# Patient Record
Sex: MALE | Race: WHITE | NOT HISPANIC OR LATINO | Employment: PART TIME | URBAN - METROPOLITAN AREA
[De-identification: names, ages, dates, MRNs, and addresses within clinical notes are randomized per-mention and may not be internally consistent; named-entity substitution may affect disease eponyms.]

---
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
## 2019-01-24 ENCOUNTER — CONVERSION ENCOUNTER (OUTPATIENT)
Dept: SURGERY | Facility: CLINIC | Age: 19
End: 2019-01-24

## 2019-01-24 ENCOUNTER — OFFICE VISIT CONVERTED (OUTPATIENT)
Dept: UROLOGY | Facility: CLINIC | Age: 19
End: 2019-01-24
Attending: UROLOGY

## 2020-03-20 ENCOUNTER — TELEMEDICINE CONVERTED (OUTPATIENT)
Dept: INTERNAL MEDICINE | Facility: CLINIC | Age: 20
End: 2020-03-20
Attending: INTERNAL MEDICINE

## 2020-04-08 ENCOUNTER — TELEMEDICINE CONVERTED (OUTPATIENT)
Dept: INTERNAL MEDICINE | Facility: CLINIC | Age: 20
End: 2020-04-08
Attending: NURSE PRACTITIONER

## 2020-05-04 ENCOUNTER — HOSPITAL ENCOUNTER (OUTPATIENT)
Dept: LAB | Facility: HOSPITAL | Age: 20
Discharge: HOME | End: 2020-05-04
Attending: INTERNAL MEDICINE

## 2020-06-01 LAB
Lab: NEGATIVE
Lab: NORMAL

## 2020-08-13 ENCOUNTER — HOSPITAL ENCOUNTER (OUTPATIENT)
Dept: OTHER | Facility: HOSPITAL | Age: 20
Discharge: HOME | End: 2020-08-13
Attending: PHYSICIAN ASSISTANT

## 2020-08-13 ENCOUNTER — OFFICE VISIT CONVERTED (OUTPATIENT)
Dept: INTERNAL MEDICINE | Facility: CLINIC | Age: 20
End: 2020-08-13
Attending: PHYSICIAN ASSISTANT

## 2020-08-13 LAB
ALBUMIN SERPL-MCNC: 4.7 G/DL (ref 3.5–5)
ALBUMIN/GLOB SERPL: 2 {RATIO} (ref 1.4–2.6)
ALP SERPL-CCNC: 57 U/L (ref 53–128)
ALT SERPL-CCNC: 16 U/L (ref 10–40)
ANION GAP SERPL CALC-SCNC: 16 MMOL/L (ref 8–19)
AST SERPL-CCNC: 18 U/L (ref 15–50)
BASOPHILS # BLD AUTO: 0.03 10*3/UL (ref 0–0.2)
BASOPHILS NFR BLD AUTO: 0.7 % (ref 0–3)
BILIRUB SERPL-MCNC: 0.48 MG/DL (ref 0.2–1.3)
BUN SERPL-MCNC: 15 MG/DL (ref 5–25)
BUN/CREAT SERPL: 17 {RATIO} (ref 6–20)
CALCIUM SERPL-MCNC: 10.2 MG/DL (ref 8.7–10.4)
CHLORIDE SERPL-SCNC: 103 MMOL/L (ref 99–111)
CONV ABS IMM GRAN: 0.01 10*3/UL (ref 0–0.2)
CONV CO2: 28 MMOL/L (ref 22–32)
CONV IMMATURE GRAN: 0.2 % (ref 0–1.8)
CONV TOTAL PROTEIN: 7.1 G/DL (ref 6.3–8.2)
CREAT UR-MCNC: 0.89 MG/DL (ref 0.7–1.2)
DEPRECATED RDW RBC AUTO: 38.2 FL (ref 35.1–43.9)
EOSINOPHIL # BLD AUTO: 0.08 10*3/UL (ref 0–0.7)
EOSINOPHIL # BLD AUTO: 1.8 % (ref 0–7)
ERYTHROCYTE [DISTWIDTH] IN BLOOD BY AUTOMATED COUNT: 11.9 % (ref 11.6–14.4)
GFR SERPLBLD BASED ON 1.73 SQ M-ARVRAT: >60 ML/MIN/{1.73_M2}
GLOBULIN UR ELPH-MCNC: 2.4 G/DL (ref 2–3.5)
GLUCOSE SERPL-MCNC: 80 MG/DL (ref 70–99)
HCT VFR BLD AUTO: 48 % (ref 42–52)
HGB BLD-MCNC: 16.1 G/DL (ref 14–18)
LYMPHOCYTES # BLD AUTO: 1.37 10*3/UL (ref 1–5)
LYMPHOCYTES NFR BLD AUTO: 30.6 % (ref 20–45)
MCH RBC QN AUTO: 29.4 PG (ref 27–31)
MCHC RBC AUTO-ENTMCNC: 33.5 G/DL (ref 33–37)
MCV RBC AUTO: 87.8 FL (ref 80–96)
MONOCYTES # BLD AUTO: 0.29 10*3/UL (ref 0.2–1.2)
MONOCYTES NFR BLD AUTO: 6.5 % (ref 3–10)
NEUTROPHILS # BLD AUTO: 2.7 10*3/UL (ref 2–8)
NEUTROPHILS NFR BLD AUTO: 60.2 % (ref 30–85)
NRBC CBCN: 0 % (ref 0–0.7)
OSMOLALITY SERPL CALC.SUM OF ELEC: 296 MOSM/KG (ref 273–304)
PLATELET # BLD AUTO: 163 10*3/UL (ref 130–400)
PMV BLD AUTO: 11.2 FL (ref 9.4–12.4)
POTASSIUM SERPL-SCNC: 4.4 MMOL/L (ref 3.5–5.3)
RBC # BLD AUTO: 5.47 10*6/UL (ref 4.7–6.1)
SODIUM SERPL-SCNC: 143 MMOL/L (ref 135–147)
WBC # BLD AUTO: 4.48 10*3/UL (ref 4.8–10.8)

## 2020-08-28 ENCOUNTER — OFFICE VISIT CONVERTED (OUTPATIENT)
Dept: INTERNAL MEDICINE | Facility: CLINIC | Age: 20
End: 2020-08-28
Attending: PHYSICIAN ASSISTANT

## 2020-08-28 ENCOUNTER — HOSPITAL ENCOUNTER (OUTPATIENT)
Dept: OTHER | Facility: HOSPITAL | Age: 20
Discharge: HOME | End: 2020-08-28
Attending: PHYSICIAN ASSISTANT

## 2020-08-28 ENCOUNTER — CONVERSION ENCOUNTER (OUTPATIENT)
Dept: INTERNAL MEDICINE | Facility: CLINIC | Age: 20
End: 2020-08-28

## 2020-08-28 LAB
25(OH)D3 SERPL-MCNC: 18.9 NG/ML (ref 30–100)
FOLATE SERPL-MCNC: 9.4 NG/ML (ref 4.8–20)
T4 FREE SERPL-MCNC: 1.1 NG/DL (ref 0.9–1.8)
TSH SERPL-ACNC: 1.66 M[IU]/L (ref 0.27–4.2)
VIT B12 SERPL-MCNC: 310 PG/ML (ref 211–911)

## 2020-09-01 LAB
CONV EBV EARLY ANTIGEN: <5 U/ML (ref 0–10.9)
CONV EBV NUCLEAR ANTIGEN: 410 U/ML (ref 0–21.9)
CONV EPSTEIN BARR VIRAL CAPSID IGM: <10 U/ML (ref 0–43.9)
CONV EPSTEIN BARR VIRUS ANTIBODY TO VIRAL CAPSID IGG: 36.7 U/ML (ref 0–21.9)

## 2020-09-02 ENCOUNTER — HOSPITAL ENCOUNTER (OUTPATIENT)
Dept: ULTRASOUND IMAGING | Facility: HOSPITAL | Age: 20
Discharge: HOME | End: 2020-09-02
Attending: PHYSICIAN ASSISTANT

## 2020-09-16 ENCOUNTER — OFFICE VISIT CONVERTED (OUTPATIENT)
Dept: INTERNAL MEDICINE | Facility: CLINIC | Age: 20
End: 2020-09-16
Attending: INTERNAL MEDICINE

## 2020-09-18 ENCOUNTER — HOSPITAL ENCOUNTER (OUTPATIENT)
Dept: CT IMAGING | Facility: HOSPITAL | Age: 20
Discharge: HOME | End: 2020-09-18
Attending: INTERNAL MEDICINE

## 2020-12-26 ENCOUNTER — HOSPITAL ENCOUNTER (OUTPATIENT)
Dept: URGENT CARE | Facility: CLINIC | Age: 20
Discharge: HOME | End: 2020-12-26
Attending: PHYSICIAN ASSISTANT

## 2021-05-12 NOTE — PROGRESS NOTES
Progress Note      Patient Name: Conrado Cummins   Patient ID: [id number]   Sex: Male   YOB: 2000    Primary Care Provider: Lori Mac MD   Referring Provider: Lori Mac MD    Visit Date: April 8, 2020    Provider: RAMIN Palacios   Location: [facility]   Location Address: [address] [place] [address]  [id number]   Location Phone: [phone]          History Of Present Illness  Video Conferencing Visit  Conrado Cummins is a 19 year old /White male who is presenting for evaluation via video conferencing. Verbal consent obtained before beginning visit.   The following staff were present during this visit: Zahra Lawson NP   Conrado Cummins is a 19 year old /White male who presents for evaluation and treatment of:      mid march was on a spring break trip, slept in the same bed with another jay  rash x2 wks ago  has gotten slightly larger but no new areas of spread  right knee, lower extremity, not present on other areas of the body  very itchy, worse at night  denies fever, chills, body aches      zoom mtg       Past Medical History  Disease Name Date Onset Notes   Allergic Rhinitis --  --    Testicle lump --  --          Past Surgical History  Procedure Name Date Notes   *Denies any surgical procedures --  --          Medication List  Name Date Started Instructions   Claritin 10 mg oral tablet  take 1 tablet (10 mg) by oral route once daily prn         Allergy List  Allergen Name Date Reaction Notes   NO KNOWN DRUG ALLERGIES --  --  --          Family Medical History  Disease Name Relative/Age Notes   Heart Disease  --    Hypertension  --          Social History  Finding Status Start/Stop Quantity Notes   Alcohol Never --/-- --  --    Tobacco Never --/-- --  --          Immunizations  NameDate Admin Mfg Trade Name Lot Number Route Inj VIS Given VIS Publication   DTaP07/29/2004 NE Not Entered  NE NE     Comments:    DTaP01/12/2001 NE Not  Entered  NE NE     Comments:    DTaP1999 NE Not Entered  NE NE 05/10/2016    Comments:    DTaP1999 NE Not Entered  NE NE     Comments:    DTaP1999 NE Not Entered  NE NE 05/10/2016    Comments:    Hepatitis A08/02/2011 NE Not Entered  NE NE     Comments:    Hepatitis A08/20/2010 NE Not Entered  NE NE     Comments:    Hepatitis B06/19/2000 NE Not Entered  NE NE     Comments:    Hepatitis  NE Not Entered  NE NE     Comments:    Hepatitis  NE Not Entered  NE NE     Comments:    Hib01/12/2001 NE Not Entered  NE NE     Comments:    Hib1999 NE Not Entered  NE NE     Comments:    Hib1999 NE Not Entered  NE NE     Comments:    Hib1999 NE Not Entered  NE NE 05/10/2016    Comments:    InfluenzaRefused 10/30/2018 NE Not Entered  NE NE     Comments:    IPV07/29/2004 NE Not Entered  NE NE     Comments:    IPV01/12/2001 NE Not Entered  NE NE     Comments:    IPV1999 NE Not Entered  NE NE     Comments:    IPV1999 NE Not Entered  NE NE     Comments:    Meningococcal (MNG)08/20/2010 NE Not Entered  NE NE     Comments:    MMR07/29/2004 NE Not Entered  NE NE     Comments:    MMR06/19/2000 NE Not Entered  NE NE     Comments:    Tdap08/20/2010 NE Not Entered  NE NE     Comments:    Pknvfojgu02/20/2010 NE Not Entered  NE NE     Comments:    Pwtodnipf17/19/2000 NE Not Entered  NE NE     Comments:          Review of Systems  · Constitutional  o Denies  o : fever, fatigue, weight loss, weight gain  · Cardiovascular  o Denies  o : lower extremity edema, claudication, chest pressure, palpitations  · Respiratory  o Denies  o : shortness of breath, wheezing, cough, hemoptysis, dyspnea on exertion  · Gastrointestinal  o Denies  o : nausea, vomiting, diarrhea, constipation, abdominal pain      Physical Examination     Gen: well-nourished, no acute distress  HENT: atraumatic, normocephalic  Eyes: extraocular movements intact, no scleral icterus  Lung: breathing comfortably, no  cough  Skin: small irregular patches of erythema on right knee and lower leg, scaling present  Neuro: grossly oriented to person, place, and time. no facial droop   Psych: normal mood and affect             Assessment  · Tinea corporis     110.5/B35.4  rash appears to be fungal. will try clotrimazole for this. if not improving in 1-2 wks, call for re-eval.     Problems Reconciled  Plan  · Orders  o ACO-39: Current medications updated and reviewed () - - 04/08/2020  · Medications  o clotrimazole 1 % topical cream   SIG: apply to the affected and surrounding areas of skin by topical route 2 times per day in the morning and evening   DISP: (1) 14 gm tube with 0 refills  Prescribed on 04/08/2020     o Medications have been Reconciled  o Transition of Care or Provider Policy  · Instructions  o Patient was educated/instructed on their diagnosis, treatment and medications prior to discharge from the clinic today.  o Call the office with any concerns or questions.  · Disposition  o Call or Return if symptoms worsen or persist.  o Prescriptions sent electronically            Electronically Signed by: RAMIN Palacios -Author on April 8, 2020 02:14:10 PM

## 2021-05-13 NOTE — PROGRESS NOTES
Progress Note      Patient Name: Conrado Cummins   Patient ID: [id number]   Sex: Male   YOB: 2000    Primary Care Provider: Lori Mac MD   Referring Provider: Lori Mac MD    Visit Date: August 28, 2020    Provider: Leonela Tony PA-C   Location: [facility]   Location Address: [address] [place] [address], [facility] [address]  [place], [place]  [id number]   Location Phone: [phone]          Chief Complaint  · night sweats   · Low wbc count       History Of Present Illness  Conrado Cummins is a 20 year old /White male who presents for evaluation and treatment of:      Pt reports he is still having night sweats.   He states he does not have these every night. He has decreased the temp in his room and it has helped with his sx.   Denies wgt loss. Appetite is normal.   No recent URI, cold.     Fatigue: pt feels tired at times.  He has hx of mono.    leukocytopenia on previous cbc.  Mom is concerned about other causes of low wbc.  Mom states he had easy bruising as a child.    LAD: on back of head. Would like to get US.  Has not changes or grown in size.     Testicular lump has resolved.   No  symptoms or complaints.       Past Medical History  Disease Name Date Onset Notes   Allergic Rhinitis --  --    Testicle lump --  --          Past Surgical History  Procedure Name Date Notes   *Denies any surgical procedures --  --          Medication List  Name Date Started Instructions   Claritin 10 mg oral tablet  take 1 tablet (10 mg) by oral route once daily prn         Allergy List  Allergen Name Date Reaction Notes   NO KNOWN DRUG ALLERGIES --  --  --        Allergies Reconciled  Family Medical History  Disease Name Relative/Age Notes   Heart Disease  --    Hypertension  --          Social History  Finding Status Start/Stop Quantity Notes   Alcohol Never --/-- --  --    Tobacco Never --/-- --  --          Immunizations  NameDate Admin Mfg Trade Name Lot Number Route Inj VIS Given VIS  Publication   DTaP07/29/2004 NE Not Entered  NE NE     Comments:    DTaP01/12/2001 NE Not Entered  NE NE     Comments:    DTaP1999 NE Not Entered  NE NE 05/10/2016    Comments:    DTaP1999 NE Not Entered  NE NE     Comments:    DTaP1999 NE Not Entered  NE NE 05/10/2016    Comments:    Hepatitis A08/02/2011 NE Not Entered  NE NE     Comments:    Hepatitis A08/20/2010 NE Not Entered  NE NE     Comments:    Hepatitis B06/19/2000 NE Not Entered  NE NE     Comments:    Hepatitis  NE Not Entered  NE NE     Comments:    Hepatitis  NE Not Entered  NE NE     Comments:    Hib01/12/2001 NE Not Entered  NE NE     Comments:    Hib1999 NE Not Entered  NE NE     Comments:    Hib1999 NE Not Entered  NE NE     Comments:    Hib1999 NE Not Entered  NE NE 05/10/2016    Comments:    InfluenzaRefused 10/30/2018 NE Not Entered  NE NE     Comments:    IPV07/29/2004 NE Not Entered  NE NE     Comments:    IPV01/12/2001 NE Not Entered  NE NE     Comments:    IPV1999 NE Not Entered  NE NE     Comments:    IPV1999 NE Not Entered  NE NE     Comments:    Meningococcal (MNG)08/20/2010 NE Not Entered  NE NE     Comments:    MMR07/29/2004 NE Not Entered  NE NE     Comments:    MMR06/19/2000 NE Not Entered  NE NE     Comments:    Tdap08/20/2010 NE Not Entered  NE NE     Comments:    Liiztdmej49/20/2010 NE Not Entered  NE NE     Comments:    Szgvxbsxb21/19/2000 NE Not Entered  NE NE     Comments:          Review of Systems  · Constitutional  o Admits  o : fatigue  o Denies  o : fever, weight gain, weight loss, chills  · Eyes  o Denies  o : changes in vision  · HENT  o Denies  o : ear pain, sore throat  · Cardiovascular  o Denies  o : chest Pain, palpitations, edema (swelling)  · Respiratory  o Denies  o : frequent cough, shortness of breath  · Gastrointestinal  o Denies  o : nausea, vomiting, changes in bowel habits  · Genitourinary  o Denies  o : dysuria, urinary frequency, urinary  "urgency, polyuria  · Integument  o Denies  o : rash  · Neurologic  o Denies  o : headache, tingling or numbness, dizziness  · Musculoskeletal  o Denies  o : joint pain, myalgias  · Endocrine  o Denies  o : polydipsia, polyphagia  · Psychiatric  o Denies  o : mood changes, memory changes, SI/HI  · Heme-Lymph  o Admits  o : easy bruising, lymph node enlargement or tenderness  o Denies  o : easy bleeding  · Allergic-Immunologic  o Denies  o : eczema, seasonal allergies, urticaria      Vitals  Date Time BP Position Site L\R Cuff Size HR RR TEMP (F) WT  HT  BMI kg/m2 BSA m2 O2 Sat HC       12/20/2018 02:34 /82 Sitting    95 - R 16 97.5 149lbs 4oz 5'  6\" 24.09 1.78 100 %    08/13/2020 08:15 /82 Sitting    114 - R  98.2 147lbs 6oz 5'  7\" 23.08 1.78 99 %    08/28/2020 08:12 /82 Sitting    101 - R 15 98.6 145lbs 0oz 5'  7\" 22.71 1.76 100 %          Physical Examination  · Constitutional  o Appearance  o : no acute distress, well-nourished  · Head and Face  o Head  o :   § Inspection  § : atraumatic, normocephalic  · Eyes  o Eyes  o : extraocular movements intact, no scleral icterus, no conjunctival injection  · Ears, Nose, Mouth and Throat  o Ears  o :   § External Ears  § : normal  § Otoscopic Examination  § : tympanic membrane appearance within normal limits bilaterally  o Nose  o :   § Intranasal Exam  § : nares patent  o Oral Cavity  o :   § Oral Mucosa  § : moist mucous membranes  o Throat  o :   § Oropharynx  § : no inflammation or lesions present, tonsils within normal limits  · Neck  o Thyroid  o : gland size normal, nontender, no nodules or masses present on palpation, symmetric  · Respiratory  o Respiratory Effort  o : breathing comfortably, symmetric chest rise  o Auscultation of Lungs  o : clear to asculatation bilaterally, no wheezes, rales, or rhonchii  · Cardiovascular  o Heart  o :   § Auscultation of Heart  § : regular rate and rhythm, no murmurs, rubs, or gallops  o Peripheral Vascular " System  o :   § Extremities  § : no edema  · Lymphatic  o Neck  o : no lymphadenopathy present  o Supraclavicular Nodes  o : no supraclavicular nodes  · Skin and Subcutaneous Tissue  o General Inspection  o : no lesions present, no areas of discoloration, skin turgor normal  · Neurologic  o Mental Status Examination  o :   § Orientation  § : grossly oriented to person, place and time  o Gait and Station  o :   § Gait Screening  § : normal gait  · Psychiatric  o General  o : normal mood and affect          Assessment  · Fatigue     780.79/R53.83  · Night sweats     780.8/R61  Discussed reassuring that sx resolve if pt adjusts the temp at his home. Discussed red flag sx of night sweats with wgt loss, fever, decreased appetite, etc. Will recheck labs today.  · Leukocytopenia     288.50/D72.819  Reviewed labs showing minimal decrease in WBC, all other lab values on cbc WNL. Discussed previous cbc WNL.  · Testicle lump     608.89/N50.9  resolved  · History of mononucleosis     V12.09/Z86.19      Plan  · Orders  o EBV antibody panel (95538, 66222, 34699) - 780.79/R53.83 - 08/28/2020  o B12 Folate levels (B12FO) - 780.79/R53.83 - 08/28/2020  o Thyroid Profile (THYII, 09358, 27553) - 780.8/R61, 288.50/D72.819, V12.09/Z86.19, 780.79/R53.83 - 08/28/2020  o Vitamin D (25-Hydroxy) Level (20874) - 780.8/R61, 288.50/D72.819, V12.09/Z86.19, 780.79/R53.83 - 08/28/2020  o ACO-39: Current medications updated and reviewed () - - 08/28/2020  o Sed rate (93984) - 780.8/R61, 288.50/D72.819, V12.09/Z86.19, 780.79/R53.83 - 08/28/2020  · Medications  o Medications have been Reconciled  o Transition of Care or Provider Policy  · Instructions  o Patient was educated/instructed on their diagnosis, treatment and medications prior to discharge from the clinic today.  · Disposition  o Call or Return if symptoms worsen or persist.  o Follow up in 1 month            Electronically Signed by: Leonela Tony PA-C -Author on August 28, 2020  08:47:53 AM

## 2021-05-13 NOTE — PROGRESS NOTES
Progress Note      Patient Name: Conrado Cummins   Patient ID: [id number]   Sex: Male   YOB: 2000    Primary Care Provider: Lori Mac MD   Referring Provider: Lori Mac MD    Visit Date: August 13, 2020    Provider: Leonela Tony PA-C   Location: [facility]   Location Address: [address] [place] [address] [address]  [place], [place]  [id number]   Location Phone: [phone]          Chief Complaint  · Lump on back on head   · Lump on groin area       History Of Present Illness  Conrado Cummins is a 20 year old /White male who presents for evaluation and treatment of:      lump on back of his head. Patient states the lump on the back of his head for about 3-4 years ago. He states Dr. Mac looked at this awhile ago for him. He states he believes there is one next to it now. He can't remember which one she saw. He states she did not run any tests, but that she said it was probably a cyst and for him to watch it to see if it gets any larger. He states he does not believe it has grown in size, but the one next to it is new. He denies pain and redness. Denies warmth to touch. Denies fever and weight loss. Denies headache and changes in vision. Denies neck stiffness.  Denies difficulty sleeping and night sweats. Denies difficulty swallowing. Denies rhinorrhea and sore throat.    He states the lump on his left groin area has been there for about a week now. He states it started out much larger and he believes it has decreased in size over the past week. He states it has not bothered him he just noticed it incidentally. Denies pain. Denies redness and warmth. Denies recent viral illnesses. Denies dysuria and hematuria. Denies discharge.           Past Medical History  Disease Name Date Onset Notes   Allergic Rhinitis --  --    Testicle lump --  --          Past Surgical History  Procedure Name Date Notes   *Denies any surgical procedures --  --          Medication List  Name Date  Started Instructions   Claritin 10 mg oral tablet  take 1 tablet (10 mg) by oral route once daily prn         Allergy List  Allergen Name Date Reaction Notes   NO KNOWN DRUG ALLERGIES --  --  --        Allergies Reconciled  Family Medical History  Disease Name Relative/Age Notes   Heart Disease  --    Hypertension  --          Social History  Finding Status Start/Stop Quantity Notes   Alcohol Never --/-- --  --    Tobacco Never --/-- --  --          Immunizations  NameDate Admin Mfg Trade Name Lot Number Route Inj VIS Given VIS Publication   DTaP07/29/2004 NE Not Entered  NE NE     Comments:    DTaP01/12/2001 NE Not Entered  NE NE     Comments:    DTaP1999 NE Not Entered  NE NE 05/10/2016    Comments:    DTaP1999 NE Not Entered  NE NE     Comments:    DTaP1999 NE Not Entered  NE NE 05/10/2016    Comments:    Hepatitis A08/02/2011 NE Not Entered  NE NE     Comments:    Hepatitis A08/20/2010 NE Not Entered  NE NE     Comments:    Hepatitis B06/19/2000 NE Not Entered  NE NE     Comments:    Hepatitis  NE Not Entered  NE NE     Comments:    Hepatitis  NE Not Entered  NE NE     Comments:    Hib01/12/2001 NE Not Entered  NE NE     Comments:    Hib1999 NE Not Entered  NE NE     Comments:    Hib1999 NE Not Entered  NE NE     Comments:    Hib1999 NE Not Entered  NE NE 05/10/2016    Comments:    InfluenzaRefused 10/30/2018 NE Not Entered  NE NE     Comments:    IPV07/29/2004 NE Not Entered  NE NE     Comments:    IPV01/12/2001 NE Not Entered  NE NE     Comments:    IPV1999 NE Not Entered  NE NE     Comments:    IPV1999 NE Not Entered  NE NE     Comments:    Meningococcal (MNG)08/20/2010 NE Not Entered  NE NE     Comments:    MMR07/29/2004 NE Not Entered  NE NE     Comments:    MMR06/19/2000 NE Not Entered  NE NE     Comments:    Tdap08/20/2010 NE Not Entered  NE NE     Comments:    Uojqcuyqs96/20/2010 NE Not Entered  NE NE     Comments:   "  Mrvbqisgi50/19/2000 NE Not Entered  NE NE     Comments:          Review of Systems  · Constitutional  o Denies  o : fever, fatigue, weight loss  · Eyes  o Denies  o : blurred vision, changes in vision  · HENT  o Denies  o : headaches, nasal congestion, sore throat  · Cardiovascular  o Denies  o : chest pain, palpitations  · Respiratory  o Denies  o : shortness of breath, cough  · Gastrointestinal  o Denies  o : nausea, vomiting, diarrhea, constipation, abdominal pain  · Genitourinary  o Denies  o : frequency, dysuria, hematuria  · Integument  o Admits  o : skin dryness  o Denies  o : rash, itching  · Neurologic  o Denies  o : tingling or numbness, dizziness, loss of balance  · Musculoskeletal  o Denies  o : limitation of motion, neck pain, hip pain  · Psychiatric  o Denies  o : anxiety, depression  · Allergic-Immunologic  o Admits  o : sinus allergy symptoms  o Denies  o : frequent illnesses      Vitals  Date Time BP Position Site L\R Cuff Size HR RR TEMP (F) WT  HT  BMI kg/m2 BSA m2 O2 Sat HC       12/20/2018 02:34 /82 Sitting    95 - R 16 97.5 149lbs 4oz 5'  6\" 24.09 1.78 100 %    01/24/2019 02:52 /78 Sitting       146lbs 4oz 5'  7\" 22.91 1.77     08/13/2020 08:15 /82 Sitting    114 - R  98.2 147lbs 6oz 5'  7\" 23.08 1.78 99 %          Physical Examination  · Constitutional  o Appearance  o : no acute distress, well-nourished  · Head and Face  o Head  o :   § Inspection  § : atraumatic, normocephalic, 0.5cm cystic lesion on right posterior scalp near occipital protuberance  · Ears, Nose, Mouth and Throat  o Ears  o :   § External Ears  § : normal  o Nose  o :   § Intranasal Exam  § : nares patent  o Oral Cavity  o :   § Oral Mucosa  § : moist mucous membranes  o Throat  o :   § Oropharynx  § : no inflammation or lesions present, tonsils within normal limits  · Respiratory  o Respiratory Effort  o : breathing comfortably, symmetric chest rise  o Auscultation of Lungs  o : clear to asculatation " bilaterally, no wheezes, rales, or rhonchii  · Cardiovascular  o Heart  o :   § Auscultation of Heart  § : regular rate and rhythm, no murmurs, rubs, or gallops  o Peripheral Vascular System  o :   § Extremities  § : no edema  · Lymphatic  o Neck  o : no lymphadenopathy present  o Supraclavicular Nodes  o : no supraclavicular nodes  · Neurologic  o Mental Status Examination  o :   § Orientation  § : grossly oriented to person, place and time  o Gait and Station  o :   § Gait Screening  § : normal gait     Lymphatic: 0.5cm left inguinal lymph node palpated on exam, no surrounding lymphadenopathy present. no warmth or fever.           Assessment  · Sebaceous cyst     706.2/L72.3  Discussed symptoms with patient and length of time he has felt this on the back of his head. Ordered labs on patient today and put in an order for U/S. Will follow-up with patient after reviewing lab results and U/S to make sure no change in sx. Patient states he understood and agreed with plan.  · Inguinal lymphadenopathy     785.6/R59.0  Discussed symptoms with patient and reassured him this was an swollen lymph node. Encouraged patient to avoid touching the area as this may cause it to stay swollen longer. Explained to patient this could last for a few weeks. Labs ordered in clinic today. Will follow-up if symptoms worsen or persist. Patient states he understood and agreed with plan.    Problems Reconciled  Plan  · Orders  o CBC with Auto Diff [facility] ([id number]) - 706.2/L72.3, 785.6/R59.0 - 08/13/2020  o CMP [facility] ([id number]) - 706.2/L72.3, 785.6/R59.0 - 08/13/2020  o ACO-39: Current medications updated and reviewed () - - 08/13/2020  o Ultrasound of head and neck (15251) - 706.2/L72.3 - 08/13/2020   Eval cyst vs LAD on right posterior scalp near occipital protuberance  · Medications  o Medications have been Reconciled  o Transition of Care or Provider Policy  · Instructions  o Handouts were given to patient: LAD  o Patient was educated/instructed  on their diagnosis, treatment and medications prior to discharge from the clinic today.  o Call the office with any concerns or questions.  o Electronically Identified Patient Education Materials Provided Electronically  · Disposition  o Call or Return if symptoms worsen or persist.  o Keep follow up appt as scheduled  o Labs drawn in office today  o Order placed for imaging            Electronically Signed by: RAIZA Brandon-LISA -Author on August 13, 2020 11:55:48 AM  Electronically Co-signed by: Zahra Gonzales MD -Reviewer on August 13, 2020 12:48:16 PM

## 2021-05-13 NOTE — PROGRESS NOTES
"   Progress Note      Patient Name: Conrado Cummins   Patient ID: [id number]   Sex: Male   YOB: 2000    Primary Care Provider: Lori Mac MD   Referring Provider: Lori Mac MD    Visit Date: September 16, 2020    Provider: Lori Mac MD   Location: [facility]   Location Address: [address] [place] [address]  [id number]   Location Phone: [phone]          Chief Complaint  · \"following up on low WBC\"  · \"following up on a spot on my scalp\"  · \"folllowing up on night sweats\"      History Of Present Illness  Conrado Cummins is a 20 year old /White male who presents for evaluation and treatment of:      Chronic issues.    He continues to have night sweats However States They Are Not Severe Enough to Where He Needs to Change the Sheets One Time Severe Enough to Where He Did Need to Change His Shirt  Continues to have lesions that appear on the back of his scalp as well as in his neck however some tend to come and go and none have gotten larger than a centimeter  They are usually nonpainful       Past Medical History  Disease Name Date Onset Notes   Allergic Rhinitis --  --    Testicle lump --  --          Past Surgical History  Procedure Name Date Notes   *Denies any surgical procedures --  --          Medication List  Name Date Started Instructions   Claritin 10 mg oral tablet  take 1 tablet (10 mg) by oral route once daily prn   Flonase Allergy Relief 50 mcg/actuation nasal spray,suspension  --    Vitamin D2 1,250 mcg (50,000 unit) oral capsule 09/02/2020 take 1 capsule by oral route once weekly for 12 weeks         Allergy List  Allergen Name Date Reaction Notes   NO KNOWN DRUG ALLERGIES --  --  --        Allergies Reconciled  Family Medical History  Disease Name Relative/Age Notes   Heart Disease  --    Hypertension  --          Social History  Finding Status Start/Stop Quantity Notes   Alcohol Never --/-- --  --    Tobacco Never --/-- --  --  "         Immunizations  NameDate Admin Mfg Trade Name Lot Number Route Inj VIS Given VIS Publication   DTaP07/29/2004 NE Not Entered  NE NE     Comments:    DTaP01/12/2001 NE Not Entered  NE NE     Comments:    DTaP1999 NE Not Entered  NE NE 05/10/2016    Comments:    DTaP1999 NE Not Entered  NE NE     Comments:    DTaP1999 NE Not Entered  NE NE 05/10/2016    Comments:    Hepatitis A08/02/2011 NE Not Entered  NE NE     Comments:    Hepatitis A08/20/2010 NE Not Entered  NE NE     Comments:    Hepatitis B06/19/2000 NE Not Entered  NE NE     Comments:    Hepatitis  NE Not Entered  NE NE     Comments:    Hepatitis  NE Not Entered  NE NE     Comments:    Hib01/12/2001 NE Not Entered  NE NE     Comments:    Hib1999 NE Not Entered  NE NE     Comments:    Hib1999 NE Not Entered  NE NE     Comments:    Hib1999 NE Not Entered  NE NE 05/10/2016    Comments:    InfluenzaRefused 10/30/2018 NE Not Entered  NE NE     Comments:    IPV07/29/2004 NE Not Entered  NE NE     Comments:    IPV01/12/2001 NE Not Entered  NE NE     Comments:    IPV1999 NE Not Entered  NE NE     Comments:    IPV1999 NE Not Entered  NE NE     Comments:    Meningococcal (MNG)08/20/2010 NE Not Entered  NE NE     Comments:    MMR07/29/2004 NE Not Entered  NE NE     Comments:    MMR06/19/2000 NE Not Entered  NE NE     Comments:    Tdap08/20/2010 NE Not Entered  NE NE     Comments:    Kegexfqag69/20/2010 NE Not Entered  NE NE     Comments:    Muyxjkzbi75/19/2000 NE Not Entered  NE NE     Comments:          Review of Systems  · Constitutional  o Denies  o : fever, fatigue, weight loss, weight gain  · Cardiovascular  o Denies  o : lower extremity edema, claudication, chest pressure, palpitations  · Respiratory  o Denies  o : shortness of breath, wheezing, frequent cough, hemoptysis, dyspnea on exertion  · Gastrointestinal  o Denies  o : nausea, vomiting, diarrhea, constipation, abdominal  "pain      Vitals  Date Time BP Position Site L\R Cuff Size HR RR TEMP (F) WT  HT  BMI kg/m2 BSA m2 O2 Sat FR L/min FiO2 HC       08/13/2020 08:15 /82 Sitting    114 - R  98.2 147lbs 6oz 5'  7\" 23.08 1.78 99 %  21%    08/28/2020 08:12 /82 Sitting    101 - R 15 98.6 145lbs 0oz 5'  7\" 22.71 1.76 100 %      09/16/2020 11:32 /84 Sitting    134 - R  99.3 147lbs 4oz 5'  7\" 23.06 1.78 98 %  21%          Physical Examination  · Constitutional  o Appearance  o : no acute distress, well-nourished  · Head and Face  o Head  o :   § Inspection  § : atraumatic, normocephalic  · Eyes  o Eyes  o : extraocular movements intact, no scleral icterus, no conjunctival injection  · Ears, Nose, Mouth and Throat  o Ears  o :   § External Ears  § : normal  o Nose  o :   § Intranasal Exam  § : nares patent  o Oral Cavity  o :   § Oral Mucosa  § : moist mucous membranes  · Respiratory  o Respiratory Effort  o : breathing comfortably, symmetric chest rise  o Auscultation of Lungs  o : clear to asculatation bilaterally, no wheezes, rales, or rhonchii  · Cardiovascular  o Heart  o :   § Auscultation of Heart  § : regular rate and rhythm, no murmurs, rubs, or gallops  o Peripheral Vascular System  o :   § Extremities  § : no edema  · Neurologic  o Mental Status Examination  o :   § Orientation  § : grossly oriented to person, place and time  o Gait and Station  o :   § Gait Screening  § : normal gait  · Psychiatric  o General  o : normal mood and affect     Very slight cervical lymphadenopathy in the occipital region as well As the anterior cervical chain           Assessment  · Screening for depression     V79.0/Z13.89  · Vitamin D deficiency     268.9/E55.9  · Cervical lymphadenopathy     785.6/R59.0  · Night sweat     780.8/R61       Check labs and imaging to work-up current concerns  Adjust meds as needed based on results of imaging and labs     Problems Reconciled  Plan  · Orders  o Vitamin D Level (87554) - 268.9/E55.9 - " 09/16/2020  o CBC with Auto Diff [facility] (73092) - V79.0/Z13.89, 785.6/R59.0, 780.8/R61, 268.9/E55.9 - 09/16/2020  o ACO-39: Current medications updated and reviewed () - - 09/16/2020  o ACO-18: Negative screen for clinical depression using a standardized tool () - V79.0/Z13.89 - 09/16/2020  o CT Neck Soft Tissue (Not Cervical Spine) without and with IV Contrast [facility] (52358) - 785.6/R59.0, 780.8/R61 - 09/16/2020  o ESR (43347) - V79.0/Z13.89, 785.6/R59.0, 780.8/R61, 268.9/E55.9 - 09/16/2020  o MARCELINA (antinuclear antibody profile) by enzyme immunoassay (15393) - V79.0/Z13.89, 785.6/R59.0, 780.8/R61, 268.9/E55.9 - 09/16/2020  o ANCA screen with reflex to ANCA titer, MPO, PR3, Saccharomyces cerevisiae IgA and IgG for evaluation of inflammatory bowel disease (09233) - V79.0/Z13.89, 785.6/R59.0, 780.8/R61, 268.9/E55.9 - 09/16/2020  · Medications  o Medications have been Reconciled  o Transition of Care or Provider Policy  · Instructions  o Depression Screen completed and scanned into the EMR under the designated folder within the patient's documents.  o Today's PHQ-9 result is __3_  o Patient was educated/instructed on their diagnosis, treatment and medications prior to discharge from the clinic today.  · Disposition  o 2 Month Follow Up  o please print labs            Electronically Signed by: Lori Mac MD -Author on October 4, 2020 03:47:23 PM

## 2021-05-14 VITALS
DIASTOLIC BLOOD PRESSURE: 84 MMHG | OXYGEN SATURATION: 98 % | SYSTOLIC BLOOD PRESSURE: 162 MMHG | HEART RATE: 134 BPM | TEMPERATURE: 99.3 F | HEIGHT: 67 IN | BODY MASS INDEX: 23.11 KG/M2 | WEIGHT: 147.25 LBS

## 2021-05-14 VITALS
DIASTOLIC BLOOD PRESSURE: 82 MMHG | HEIGHT: 67 IN | BODY MASS INDEX: 22.76 KG/M2 | SYSTOLIC BLOOD PRESSURE: 124 MMHG | WEIGHT: 145 LBS | OXYGEN SATURATION: 100 % | TEMPERATURE: 98.6 F | HEART RATE: 101 BPM | RESPIRATION RATE: 15 BRPM

## 2021-05-15 VITALS
BODY MASS INDEX: 23.99 KG/M2 | WEIGHT: 149.25 LBS | HEART RATE: 95 BPM | TEMPERATURE: 97.5 F | RESPIRATION RATE: 16 BRPM | SYSTOLIC BLOOD PRESSURE: 138 MMHG | OXYGEN SATURATION: 100 % | HEIGHT: 66 IN | DIASTOLIC BLOOD PRESSURE: 82 MMHG

## 2021-05-15 VITALS
OXYGEN SATURATION: 99 % | DIASTOLIC BLOOD PRESSURE: 82 MMHG | HEART RATE: 114 BPM | SYSTOLIC BLOOD PRESSURE: 130 MMHG | HEIGHT: 67 IN | TEMPERATURE: 98.2 F | BODY MASS INDEX: 23.13 KG/M2 | WEIGHT: 147.37 LBS

## 2021-05-15 VITALS
WEIGHT: 146.25 LBS | DIASTOLIC BLOOD PRESSURE: 78 MMHG | SYSTOLIC BLOOD PRESSURE: 120 MMHG | BODY MASS INDEX: 22.96 KG/M2 | HEIGHT: 67 IN

## 2021-05-16 VITALS
OXYGEN SATURATION: 99 % | RESPIRATION RATE: 16 BRPM | HEIGHT: 66 IN | TEMPERATURE: 98 F | HEART RATE: 98 BPM | BODY MASS INDEX: 23.52 KG/M2 | WEIGHT: 146.37 LBS | SYSTOLIC BLOOD PRESSURE: 132 MMHG | DIASTOLIC BLOOD PRESSURE: 82 MMHG

## 2021-07-20 ENCOUNTER — CLINICAL SUPPORT (OUTPATIENT)
Dept: INTERNAL MEDICINE | Facility: CLINIC | Age: 21
End: 2021-07-20

## 2021-07-20 DIAGNOSIS — Z11.1 SCREENING FOR TUBERCULOSIS: Primary | ICD-10-CM

## 2021-07-20 PROCEDURE — 86580 TB INTRADERMAL TEST: CPT | Performed by: INTERNAL MEDICINE

## 2021-07-22 ENCOUNTER — CLINICAL SUPPORT (OUTPATIENT)
Dept: INTERNAL MEDICINE | Facility: CLINIC | Age: 21
End: 2021-07-22

## 2021-07-22 PROCEDURE — 86580 TB INTRADERMAL TEST: CPT | Performed by: INTERNAL MEDICINE

## 2021-07-23 LAB
INDURATION: 0 MM (ref 0–10)
Lab: NORMAL
Lab: NORMAL
TB SKIN TEST: NEGATIVE

## 2023-06-05 ENCOUNTER — OFFICE VISIT (OUTPATIENT)
Dept: INTERNAL MEDICINE | Facility: CLINIC | Age: 23
End: 2023-06-05
Payer: COMMERCIAL

## 2023-06-05 VITALS
BODY MASS INDEX: 23.93 KG/M2 | TEMPERATURE: 97.8 F | DIASTOLIC BLOOD PRESSURE: 81 MMHG | OXYGEN SATURATION: 99 % | WEIGHT: 152.8 LBS | SYSTOLIC BLOOD PRESSURE: 134 MMHG | HEART RATE: 104 BPM

## 2023-06-05 DIAGNOSIS — R59.1 LYMPHADENOPATHY: Primary | ICD-10-CM

## 2023-06-05 DIAGNOSIS — E55.9 VITAMIN D DEFICIENCY: ICD-10-CM

## 2023-06-05 DIAGNOSIS — F41.9 ANXIETY: ICD-10-CM

## 2023-06-05 DIAGNOSIS — R03.0 ELEVATED BLOOD PRESSURE READING: ICD-10-CM

## 2023-06-05 PROCEDURE — 99214 OFFICE O/P EST MOD 30 MIN: CPT | Performed by: NURSE PRACTITIONER

## 2023-06-05 PROCEDURE — 1159F MED LIST DOCD IN RCRD: CPT | Performed by: NURSE PRACTITIONER

## 2023-06-05 PROCEDURE — 1160F RVW MEDS BY RX/DR IN RCRD: CPT | Performed by: NURSE PRACTITIONER

## 2023-06-05 NOTE — ASSESSMENT & PLAN NOTE
Provided patient with local therapy resource list, he will work on anxiety management as this will likely help to improve blood pressure.

## 2023-06-05 NOTE — PROGRESS NOTES
Chief Complaint  Facial Swelling (Swollen lymph node/Was painful but feels better now)    Subjective         Conrado Cummins presents to [facility]  Patient reports 10 days ago he noticed a lymph node swollen under his chin. Slowly improved over the weekend. This was slightly tender to touch but he feels like this was because he continued to touch the area. Denies erythema, warmth. He has not recently been sick. Denies cough, congestion, runny nose, fever, tick bite, dental work or pain.     Vitamin D deficiency-  He has been taking an daily vitamin sporadically, would like levels repeated.     Elevated blood pressure-  He does not check routinely at home. Reports significant family history of hypertension. He is active and works on eating well, does not eat a lot of processed foods. Does seem related to stress often. 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 Admits to anxiety, even at today's visit. He does plan on establishing with a local therapist.         Objective     Vitals:    06/05/23 1115   BP: 134/81   Pulse: 104   Temp: 97.8 °F (36.6 °C)   TempSrc: Temporal   SpO2: 99%   Weight: 69.3 kg (152 lb 12.8 oz)      Body mass index is 23.93 kg/m².    Wt Readings from Last 3 Encounters:   06/05/23 69.3 kg (152 lb 12.8 oz)   06/20/21 63.5 kg (140 lb)   09/16/20 66.8 kg (147 lb 4 oz)     BP Readings from Last 3 Encounters:   06/05/23 134/81   07/27/21 143/84   06/20/21 140/96                Physical Exam  Constitutional:       Appearance: Normal appearance.   HENT:      Head: Normocephalic and atraumatic.      Nose: Nose normal.      Mouth/Throat:      Mouth: Mucous membranes are moist.      Pharynx: Oropharynx is clear.   Eyes:      Extraocular Movements: Extraocular movements intact.      Conjunctiva/sclera: Conjunctivae normal.      Pupils: Pupils are equal, round, and  reactive to light.   Cardiovascular:      Rate and Rhythm: Normal rate and regular rhythm.      Heart sounds: Normal heart sounds.   Pulmonary:      Effort: Pulmonary effort is normal.      Breath sounds: Normal breath sounds.   Lymphadenopathy:      Head:      Left side of head: Submandibular adenopathy present.      Comments: Pea sized, nontender, mobile lymphadenopathy    Skin:     General: Skin is warm and dry.   Neurological:      General: No focal deficit present.      Mental Status: He is alert and oriented to person, place, and time.   Psychiatric:         Mood and Affect: Mood normal.         Behavior: Behavior normal.         Thought Content: Thought content normal.        Result Review :   The following data was reviewed by: RAMIN Glynn on 06/05/2023:      Procedures    Assessment and Plan   Diagnoses and all orders for this visit:    1. Lymphadenopathy (Primary)  Assessment & Plan:  CBC today. Could consider further lab workup at follow up with persistent adenopathy. Likely reactive.    Orders:  -     CBC w AUTO Differential  -     Comprehensive metabolic panel    2. Vitamin D deficiency  Assessment & Plan:  Continue vitamin D supplement, vitamin D level with labs today.       Orders:  -     Vitamin D 25 hydroxy    3. Elevated blood pressure reading  Assessment & Plan:  He will work on lifestyle modifications, including increasing physical activity, reducing sodium and working on decreasing stress level. Monitor blood pressure daily x 1 month and follow up with Dr. Mac at that time with log for review. Will consider medication management based on results. He should notify clinic with consistent elevations greater than 130/80. He would like to postpone routine labs until that time, would recommend a thyroid panel at follow up.       4. Anxiety  Assessment & Plan:  Provided patient with local therapy resource list, he will work on anxiety management as this will likely help to improve blood  pressure.             Follow Up   Return in about 1 month (around 7/5/2023) for Follow up with Dr. Mac, Annual physical.  Patient was given instructions and counseling regarding his condition or for health maintenance advice. Please see specific information pulled into the AVS if appropriate.

## 2023-06-05 NOTE — ASSESSMENT & PLAN NOTE
He will work on lifestyle modifications, including increasing physical activity, reducing sodium and working on decreasing stress level. Monitor blood pressure daily x 1 month and follow up with Dr. Mac at that time with log for review. Will consider medication management based on results. He should notify clinic with consistent elevations greater than 130/80. He would like to postpone routine labs until that time, would recommend a thyroid panel at follow up.

## 2023-06-05 NOTE — ASSESSMENT & PLAN NOTE
CBC today. Could consider further lab workup at follow up with persistent adenopathy. Likely reactive.

## 2023-08-09 ENCOUNTER — TELEPHONE (OUTPATIENT)
Dept: INTERNAL MEDICINE | Facility: CLINIC | Age: 23
End: 2023-08-09

## 2023-08-09 NOTE — TELEPHONE ENCOUNTER
Caller: Conrado Cummins    Relationship: Self    Best call back number: [phone]    What was the call regarding: PATIENT IS CHECKING IF TRIPP MALDONADO SENT TEST RESULTS VIA FAX YET

## 2023-08-15 ENCOUNTER — OFFICE VISIT (OUTPATIENT)
Dept: INTERNAL MEDICINE | Facility: CLINIC | Age: 23
End: 2023-08-15
Payer: COMMERCIAL

## 2023-08-15 VITALS
HEART RATE: 108 BPM | TEMPERATURE: 98.8 F | BODY MASS INDEX: 24.86 KG/M2 | DIASTOLIC BLOOD PRESSURE: 78 MMHG | WEIGHT: 149.2 LBS | HEIGHT: 65 IN | OXYGEN SATURATION: 100 % | SYSTOLIC BLOOD PRESSURE: 138 MMHG

## 2023-08-15 DIAGNOSIS — E55.9 VITAMIN D DEFICIENCY: ICD-10-CM

## 2023-08-15 DIAGNOSIS — R53.83 OTHER FATIGUE: ICD-10-CM

## 2023-08-15 DIAGNOSIS — Z00.00 ANNUAL PHYSICAL EXAM: Primary | ICD-10-CM

## 2023-08-15 DIAGNOSIS — R59.1 LYMPHADENOPATHY: ICD-10-CM

## 2023-08-15 DIAGNOSIS — Z13.220 SCREENING, LIPID: ICD-10-CM

## 2023-08-15 DIAGNOSIS — Z11.59 NEED FOR HEPATITIS C SCREENING TEST: ICD-10-CM

## 2023-08-15 NOTE — PROGRESS NOTES
"Chief Complaint  Annual Exam    Subjective      Conrado Cummins presents to [facility]  History of Present Illness    Lymphadenopathy  Has been present for a while comes and goes right now all smaller than 1 cm  Reviewed prior imaging done a few years ago that did not show anything worrisome.  Denies night sweats or fevers.    Fatigue  Not super significant but something he has noticed that this somewhat frustrating    He is trying to be active and work out on a regular basis    Reviewed recent labs; normal cbc and cmp done at an outside facility    Hip popping with going up stairs  Not painful  No injury    HTN slightly elevated usually at home running a bit less  No chest pain  No trouble breathing  No leg swelling      Objective   Vital Signs:   /78 (BP Location: Left arm, Patient Position: Sitting, Cuff Size: Adult)   Pulse 108   Temp 98.8 øF (37.1 øC) (Temporal)   Ht 165.1 cm (65\")   Wt 67.7 kg (149 lb 3.2 oz)   SpO2 100%   BMI 24.83 kg/mý     Wt Readings from Last 3 Encounters:   08/15/23 67.7 kg (149 lb 3.2 oz)   06/05/23 69.3 kg (152 lb 12.8 oz)   06/20/21 63.5 kg (140 lb)     BP Readings from Last 3 Encounters:   08/15/23 138/78   06/05/23 134/81   07/27/21 143/84         Physical Exam  Vitals reviewed.   Constitutional:       Appearance: Normal appearance. He is well-developed.   HENT:      Head: Normocephalic and atraumatic.      Right Ear: External ear normal.      Left Ear: External ear normal.   Eyes:      Conjunctiva/sclera: Conjunctivae normal.      Pupils: Pupils are equal, round, and reactive to light.   Cardiovascular:      Rate and Rhythm: Normal rate and regular rhythm.      Heart sounds: No murmur heard.    No friction rub. No gallop.   Pulmonary:      Effort: Pulmonary effort is normal.      Breath sounds: Normal breath sounds. No wheezing or rhonchi.   Skin:     General: Skin is warm and dry.   Neurological:      Mental Status: He " is alert and oriented to person, place, and time.   Psychiatric:         Mood and Affect: Affect normal.         Behavior: Behavior normal.         Thought Content: Thought content normal.        Result Review :  The following data was reviewed by: Lori Mac MD on 08/15/2023:    LABS          No visits with results within 1 Month(s) from this visit.   Latest known visit with results is:   Clinical Support on 07/20/2021   Component Date Value    TB Skin Test 07/22/2021 Negative     Induration 07/22/2021 0     Injection Date & Time 07/22/2021 07/20/2021 1000am     Read Date & Time 07/22/2021 07/22/2021 1015am        No results found for: SARSANTIGEN, COVID19, RAPFLUA, RAPFLUB, FLUAAG, FLUABDAG, FLUABDAG, FLU, FLU, FLUBAG, RAPSCRN, STREPAAG, RSV, POCPREGUR, MONOSPOT, INR, LEADCAPBLD, POCLEAD, BILIRUBINUR, POCGLU     IMAGING  No Images in the past 120 days found..    Procedures         HEALTH MAINTENANCE   BMI is within normal parameters. No other follow-up for BMI required.       Social History     Tobacco Use   Smoking Status Never    Passive exposure: Never   Smokeless Tobacco Never              Assessment and Plan   Diagnoses and all orders for this visit:    1. Annual physical exam (Primary)  Comments:  Counseled on diet exercise, and routine screenings as well as vaccines for which he is up-to-date    2. Screening, lipid    3. Vitamin D deficiency  Comments:  We will check level and adjust as needed  Orders:  -     Vitamin D,25-Hydroxy    4. Lymphadenopathy  Comments:  Appears to be reactive lymphadenopathy at this point however discussed worrisome symptoms for which we would want to do repeat imaging    5. Other fatigue  Comments:  We will check labs and adjust as needed  Orders:  -     Vitamin B12  -     Magnesium  -     Iron Profile    6. Need for hepatitis C screening test  -     Hepatitis C Antibody; Future              FOLLOW UP  Return in about 1 year (around 8/15/2024).  Patient was given  instructions and counseling regarding his condition or for health maintenance advice. Please see specific information pulled into the AVS if appropriate.       Lori Mac MD  08/18/23  07:37 EDT    CURRENT & DISCONTINUED MEDICATIONS  Current Outpatient Medications   Medication Instructions    loratadine (CLARITIN) 10 MG tablet Claritin 10 mg oral tablet take 1 tablet (10 mg) by oral route once daily prn   Active       There are no discontinued medications.

## 2023-09-01 ENCOUNTER — TELEPHONE (OUTPATIENT)
Dept: INTERNAL MEDICINE | Facility: CLINIC | Age: 23
End: 2023-09-01
Payer: COMMERCIAL

## 2023-09-01 NOTE — TELEPHONE ENCOUNTER
Caller: Conrado Cummins    Relationship to patient: Self    Best call back number: [phone]     Patient is needing: PATIENT CALLED IN STATING HE RECEIVED A NOTIFICATION THAT HE HAS A MESSAGE ON HIS MYCHART. PATIENT IS NOT ABLE TO [facility]ESS [facility] AT THIS TIME AND WOULD LIKE A CALL BACK TO ADVISE WHAT THE MESSAGE IS.

## 2023-09-15 ENCOUNTER — OFFICE VISIT (OUTPATIENT)
Dept: INTERNAL MEDICINE | Facility: CLINIC | Age: 23
End: 2023-09-15
Payer: COMMERCIAL

## 2023-09-15 VITALS
TEMPERATURE: 98 F | BODY MASS INDEX: 25.74 KG/M2 | HEART RATE: 86 BPM | HEIGHT: 65 IN | SYSTOLIC BLOOD PRESSURE: 126 MMHG | WEIGHT: 154.5 LBS | DIASTOLIC BLOOD PRESSURE: 84 MMHG | OXYGEN SATURATION: 99 %

## 2023-09-15 DIAGNOSIS — R00.2 PALPITATIONS: Primary | ICD-10-CM

## 2023-09-15 PROCEDURE — 1159F MED LIST DOCD IN RCRD: CPT | Performed by: INTERNAL MEDICINE

## 2023-09-15 PROCEDURE — 1160F RVW MEDS BY RX/DR IN RCRD: CPT | Performed by: INTERNAL MEDICINE

## 2023-09-15 PROCEDURE — 99213 OFFICE O/P EST LOW 20 MIN: CPT | Performed by: INTERNAL MEDICINE

## 2023-09-15 NOTE — PROGRESS NOTES
"Chief Complaint  Follow-up (ER follow up for heart palp., still concerned about heart palpations )    Subjective       Conrado Cummins presents to [facility]    HPI   Presenting for complaint of heart palpitations.  He states that he was in [place] playing pickle ball when he had an episode of fast heart rate that lasted for an hour or more.  He states that he called the ambulance and was taken to the ER.  By the time he got to the ER his heart rate had come down some and his rhythm was sinus.  He was prescribed metoprolol and set up with a cardiology follow-up.  He states that he missed the initial appointment in [place] because he had class.  He would like to be referred to a cardiologist in [place].    He states that he has had several more episodes of tachycardia since the initial episode.  The longest episode lasted about 6 hours and his heart rate was from 110-130 during that time.  He denies any associated chest pain, shortness of breath, diaphoresis.    Objective     Vitals:    09/15/23 0917   BP: 126/84   BP Location: Right arm   Patient Position: Sitting   Cuff Size: Adult   Pulse: 86   Temp: 98 °F (36.7 °C)   TempSrc: Temporal   SpO2: 99%   Weight: 70.1 kg (154 lb 8 oz)   Height: 165.1 cm (65\")      Wt Readings from Last 3 Encounters:   09/15/23 70.1 kg (154 lb 8 oz)   08/15/23 67.7 kg (149 lb 3.2 oz)   06/05/23 69.3 kg (152 lb 12.8 oz)      BP Readings from Last 3 Encounters:   09/15/23 126/84   08/15/23 138/78   06/05/23 134/81        Body mass index is 25.71 kg/m².    BMI is >= 25 and <30. (Overweight) The following options were offered after discussion;: exercise counseling/recommendations and nutrition counseling/recommendations       Physical Exam  Vitals reviewed.   Constitutional:       Appearance: Normal appearance. He is well-developed.   HENT:      Head: Normocephalic and atraumatic.      Mouth/Throat:      Pharynx: No " oropharyngeal exudate.   Eyes:      Conjunctiva/sclera: Conjunctivae normal.      Pupils: Pupils are equal, round, and reactive to light.   Neck:      Thyroid: No thyromegaly or thyroid tenderness.   Cardiovascular:      Rate and Rhythm: Normal rate and regular rhythm.      Heart sounds: No murmur heard.    No friction rub. No gallop.   Pulmonary:      Effort: Pulmonary effort is normal.      Breath sounds: Normal breath sounds. No wheezing or rhonchi.   Lymphadenopathy:      Cervical: No cervical adenopathy.   Skin:     General: Skin is warm and dry.   Neurological:      Mental Status: He is alert and oriented to person, place, and time.   Psychiatric:         Mood and Affect: Affect normal.        Result Review :   The following data was reviewed by: Zahra Gonzales MD on 09/15/2023:        Procedures    Assessment and Plan   Diagnoses and all orders for this visit:    1. Palpitations (Primary)  Assessment & Plan:  Will refer to cardiology, [facility] location per patient request.  We will go ahead and order a Holter monitor as well  Will refill metoprolol    Orders:  -     Ambulatory Referral to Cardiology  -     Holter monitor - 24 hour; Future    Other orders  -     metoprolol tartrate (LOPRESSOR) 25 MG tablet; Take 1 tablet by mouth 2 (Two) Times a Day.  Dispense: 60 tablet; Refill: 2          Follow Up   Return if symptoms worsen or fail to improve.  Patient was given instructions and counseling regarding his condition or for health maintenance advice. Please see specific information pulled into the AVS if appropriate.     Patient was instructed and educated on their diagnoses and treatment plan prior to leaving the office. If symptoms worsen or persist, seek emergency medical attention. Take all medications as prescribed. Call the office if any questions or concerns arise.

## 2023-09-15 NOTE — ASSESSMENT & PLAN NOTE
Will refer to cardiology, requested [place] location per patient request.  We will go ahead and order a Holter monitor as well  Will refill metoprolol

## 2023-09-18 ENCOUNTER — TELEPHONE (OUTPATIENT)
Dept: INTERNAL MEDICINE | Facility: CLINIC | Age: 23
End: 2023-09-18
Payer: COMMERCIAL

## 2023-09-18 NOTE — TELEPHONE ENCOUNTER
Caller: Conrado Cummins    Relationship to patient: Self    Best call back number: [phone]     PATIENT STATED HE WAS SEEN LAST WEEK WITH PROVIDER LALA AND SAID SHE SENT A REFERRAL FOR A CARDIOLOGIST BUT HE IS NEEDING CARDIOLOGIST IN  IN [place] AND [facility].     PLEASE ADVISE

## 2024-01-16 ENCOUNTER — TELEMEDICINE (OUTPATIENT)
Dept: INTERNAL MEDICINE | Facility: CLINIC | Age: 24
End: 2024-01-16
Payer: COMMERCIAL

## 2024-01-16 DIAGNOSIS — R00.2 PALPITATIONS: ICD-10-CM

## 2024-01-16 DIAGNOSIS — F41.9 ANXIETY: Primary | ICD-10-CM

## 2024-01-16 PROCEDURE — 99214 OFFICE O/P EST MOD 30 MIN: CPT | Performed by: PHYSICIAN ASSISTANT

## 2024-01-16 RX ORDER — SERTRALINE HYDROCHLORIDE 25 MG/1
25 TABLET, FILM COATED ORAL DAILY
Qty: 30 TABLET | Refills: 2 | Status: SHIPPED | OUTPATIENT
Start: 2024-01-16 | End: 2024-02-15

## 2024-01-16 RX ORDER — BUSPIRONE HYDROCHLORIDE 5 MG/1
5 TABLET ORAL 3 TIMES DAILY
Qty: 30 TABLET | Refills: 1 | Status: SHIPPED | OUTPATIENT
Start: 2024-01-16

## 2024-01-16 NOTE — ASSESSMENT & PLAN NOTE
Will go ahead and treat patient with low dose zoloft at this time and buspar to use as needed.  Patient to follow up with PCP in 1 month as scheduled to discuss results and see how patient is doing on this medication. Discussed black box warning of suicidal ideations or intrusive thoughts when starting the medication.  Patient to discontinue medication and contact our office as soon as possible if this happens.  Would recommend counseling as well.  Call for any questions or concerns.

## 2024-01-16 NOTE — PROGRESS NOTES
Chief Complaint  Anxiety    Subjective          Conrado Cummins presents to [facility]    Patient agrees to participate in a telemedicine evaluation performed by Talia Watson PA-C. Patient authorizes the electronic transmission of medical information and/or videoconference session. Patient understands that he/she can still pursue face-to-face consultation if desired. Patient understands that as with any technology, telemedicine does have its limitations. There is no guarantee, therefore, that this telemedicine session will eliminate the need for patient to see a provider in person if the provider feels a physical exam or vital signs are necessary to care for the patient. Patient understands and would like to proceed with telemedicine visit at this time. Patient located in [place], [place]; provider located at [place], [place].      Palpitations- patient had episode in August 2023, went to the ER but converted to sinus rhythm by the time he got to the ER.  He has been on metoprolol 12.5mg and has done well.  Saw Dr. Johnson, Cardiologist who had ordered echo and holter monitor which patient was unable to get done.  Last Thursday patient was taking shower when he started to have centralized chest pain.  He was evaluated at the ED at that time and diagnosed with anxiety.  Patient does think anxiety is contributing to his symptoms because he is in his final semester of nursing school.  Is scheduled to graduate in April.     He has been very anxious about health problems, specifically the possibility of arrhythmias.  He is seeing a therapist once weekly which helps.  He is interested in starting medication for his anxiety.  No SI/HI.       25 minutes spent face to face time via Twilio.    Objective   Vital Signs:   There were no vitals taken for this visit.    Physical Exam     Gen: well-nourished, no acute distress    HENT: atraumatic, normocephalic    Eyes: extraocular  movements intact, no scleral icterus    Lung: breathing comfortably, no cough    Skin: no visible rash, no lesions    Neuro: grossly oriented to person, place, and time. no facial droop     Psych: normal mood and affect        Result Review :          Procedures      Assessment and Plan    Diagnoses and all orders for this visit:    1. Anxiety (Primary)  Assessment & Plan:  Will go ahead and treat patient with low dose zoloft at this time and buspar to use as needed.  Patient to follow up with PCP in 1 month as scheduled to discuss results and see how patient is doing on this medication. Discussed black box warning of suicidal ideations or intrusive thoughts when starting the medication.  Patient to discontinue medication and contact our office as soon as possible if this happens.  Would recommend counseling as well.  Call for any questions or concerns.       2. Palpitations  Assessment & Plan:  Continue to follow up with Cardiology and have echo/holter monitor completed as ordered.        Other orders  -     sertraline (Zoloft) 25 MG tablet; Take 1 tablet by mouth Daily for 30 days.  Dispense: 30 tablet; Refill: 2  -     busPIRone (BUSPAR) 5 MG tablet; Take 1 tablet by mouth 3 (Three) Times a Day.  Dispense: 30 tablet; Refill: 1            I spent 34 minutes caring for Conrado on this date of service. This time includes time spent by me in the following activities:preparing for the visit, reviewing tests, obtaining and/or reviewing a separately obtained history, performing a medically appropriate examination and/or evaluation , counseling and educating the patient/family/caregiver, ordering medications, tests, or procedures, and documenting information in the medical record  Follow Up   Return in about 4 weeks (around 2/13/2024).  Patient was given instructions and counseling regarding his condition or for health maintenance advice. Please see specific information pulled into the AVS if appropriate.

## 2024-01-17 ENCOUNTER — TELEPHONE (OUTPATIENT)
Dept: INTERNAL MEDICINE | Facility: CLINIC | Age: 24
End: 2024-01-17
Payer: COMMERCIAL

## 2024-03-04 ENCOUNTER — OFFICE VISIT (OUTPATIENT)
Dept: INTERNAL MEDICINE | Facility: CLINIC | Age: 24
End: 2024-03-04
Payer: COMMERCIAL

## 2024-03-04 VITALS
DIASTOLIC BLOOD PRESSURE: 74 MMHG | BODY MASS INDEX: 25.12 KG/M2 | HEIGHT: 65 IN | HEART RATE: 99 BPM | TEMPERATURE: 97.9 F | SYSTOLIC BLOOD PRESSURE: 130 MMHG | RESPIRATION RATE: 18 BRPM | OXYGEN SATURATION: 99 % | WEIGHT: 150.8 LBS

## 2024-03-04 DIAGNOSIS — R00.2 PALPITATIONS: Primary | ICD-10-CM

## 2024-03-04 DIAGNOSIS — I47.10 SVT (SUPRAVENTRICULAR TACHYCARDIA): ICD-10-CM

## 2024-03-04 DIAGNOSIS — F41.9 ANXIETY: ICD-10-CM

## 2024-03-04 DIAGNOSIS — E55.9 VITAMIN D DEFICIENCY: ICD-10-CM

## 2024-03-04 PROCEDURE — 99214 OFFICE O/P EST MOD 30 MIN: CPT | Performed by: INTERNAL MEDICINE

## 2024-03-04 RX ORDER — METOPROLOL SUCCINATE 25 MG/1
25 TABLET, EXTENDED RELEASE ORAL DAILY
COMMUNITY
Start: 2024-02-06

## 2024-03-04 NOTE — PROGRESS NOTES
Chief Complaint  Anxiety and Palpitations (Referral for Cardiology pended. SVT concerns. Seen at the ER twice for this. )    Subjective      History of Present Illness  The patient is a 23-year-old male here today to follow up for his SVT.    In 08/2023, he was playing pickleball in [place] and had an incident of SVT. EMS arrived, he called 911. The EKG did not capture the SVT, but his watch showed it was 198. He definitely felt it. He went to the ER and was given metoprolol. From 08/2023 through 12/2023, it happened 2 more times. At first, he did not take the metoprolol. After 2 more times, he started taking metoprolol 12.5 mg, just half of the 25 mg tablet, which seemed to maintain it pretty decent. Through 01/2024, it happened again about 2 to 3 more times. He went to the ER again because he was having chest pain. Before that, it had just been really fast out of nowhere. He would do the Valsalva maneuver and it seemed to convert. He has not had an echo or Holter monitor. He did see a cardiologist in [place], but did not schedule the echo or holter.     In 02/2024, his heart rate was 187. It has been happening multiple days a week now. He thinks it is just PVCs. He has been woken up overnight with it. It is impacting his quality of life. He has stopped exercising because he gets anxious either thinking or having a PVC or getting fast and thinking he is going to go into SVT. He is gaining weight. He increased the metoprolol to 25 mg from 02/2024. He saw a general cardiologist. He is moving to [place] soon and would like to see [facility], [facility] in [place]. He has never passed out, but he has gotten lightheaded. He had basic blood work, which was normal. He checks his blood pressure every morning and it has been consistently 120/80 or below. He thinks the metoprolol is helping.    He has tried BuSpar. He was in a stressful period of school at that time. He decided to start the BuSpar. He is  "on spring break now and plans to start the Zoloft this week.   His mother takes metoprolol every day. His paternal grandmother had heart arrhythmias.   He has not received his influenza vaccine this year.His vitamin D was a little low. He has been taking 1000 international units of vitamin D.         Objective   Vital Signs:   Vitals:    03/04/24 1147   BP: 130/74   BP Location: Left arm   Patient Position: Sitting   Cuff Size: Adult   Pulse: 99   Resp: 18   Temp: 97.9 °F (36.6 °C)   SpO2: 99%   Weight: 68.4 kg (150 lb 12.8 oz)   Height: 165.1 cm (65\")     Body mass index is 25.09 kg/m².    Wt Readings from Last 3 Encounters:   03/04/24 68.4 kg (150 lb 12.8 oz)   09/15/23 70.1 kg (154 lb 8 oz)   08/15/23 67.7 kg (149 lb 3.2 oz)     BP Readings from Last 3 Encounters:   03/04/24 130/74   09/15/23 126/84   08/15/23 138/78       Health Maintenance   Topic Date Due    HEPATITIS C SCREENING  Never done    INFLUENZA VACCINE  08/01/2023    COVID-19 Vaccine (4 - 2023-24 season) 09/01/2023    ANNUAL PHYSICAL  08/15/2024    BMI FOLLOWUP  09/15/2024    TDAP/TD VACCINES (3 - Td or Tdap) 02/05/2031    Pneumococcal Vaccine 0-64  Aged Out       Physical Exam  Vitals reviewed.   Constitutional:       Appearance: Normal appearance. He is well-developed.   HENT:      Head: Normocephalic and atraumatic.      Right Ear: External ear normal.      Left Ear: External ear normal.   Eyes:      Conjunctiva/sclera: Conjunctivae normal.      Pupils: Pupils are equal, round, and reactive to light.   Cardiovascular:      Rate and Rhythm: Normal rate and regular rhythm.      Heart sounds: No murmur heard.     No friction rub. No gallop.   Pulmonary:      Effort: Pulmonary effort is normal.      Breath sounds: Normal breath sounds. No wheezing or rhonchi.   Skin:     General: Skin is warm and dry.   Neurological:      Mental Status: He is alert and oriented to person, place, and time.   Psychiatric:         Mood and Affect: Affect normal.         " Behavior: Behavior normal.         Thought Content: Thought content normal.        Physical Exam        Result Review :  The following data was reviewed by: Lori Mac MD on 03/04/2024:         Results  Laboratory Studies  Labs were reviewed with the patient.      Procedures          Assessment & Plan  The patient is a 23-year-old male here today to follow up for his SVT.    1. SVT.  His blood pressure is slightly elevated today. He is interested in an ablation. I will place an order for an echocardiogram and Holter monitor. I will refer to an, EP doctor.     2. Anxiety.  His anxiety has kicked up in and around the SVT.  He was encouraged to try Zoloft 25 mg.     3. Vitamin D deficiency.  He was encouraged to take vitamin D consistently.     Follow-up  The patient will follow up in 3 months.    Assessment & Plan  Palpitations    SVT (supraventricular tachycardia)    Anxiety    Vitamin D deficiency      Orders Placed This Encounter   Procedures    Ambulatory Referral to Cardiology    Holter monitor - 24 hour    Adult Transthoracic Echo Complete w/ Color, Spectral and Contrast if necessary per protocol                         FOLLOW UP  Return in about 3 months (around 6/4/2024).  Patient was given instructions and counseling regarding his condition or for health maintenance advice. Please see specific information pulled into the AVS if appropriate.       Lori Mac MD  03/04/24  13:05 EST    CURRENT & DISCONTINUED MEDICATIONS  Current Outpatient Medications   Medication Instructions    busPIRone (BUSPAR) 5 mg, Oral, 3 Times Daily    metoprolol succinate XL (TOPROL-XL) 25 mg, Oral, Daily    sertraline (ZOLOFT) 25 mg, Oral, Daily       Medications Discontinued During This Encounter   Medication Reason    metoprolol tartrate (LOPRESSOR) 25 MG tablet *Therapy completed    loratadine (CLARITIN) 10 MG tablet         Patient or patient representative verbalized consent for the use of Ambient Listening during  the visit with  Lori Mac MD for chart documentation. 3/4/2024  16:03 EST

## 2024-03-18 ENCOUNTER — HOSPITAL ENCOUNTER (OUTPATIENT)
Dept: CARDIOLOGY | Facility: HOSPITAL | Age: 24
Discharge: HOME | End: 2024-03-18
Admitting: INTERNAL MEDICINE
Payer: COMMERCIAL

## 2024-03-18 VITALS
SYSTOLIC BLOOD PRESSURE: 148 MMHG | WEIGHT: 149.91 LBS | HEART RATE: 66 BPM | BODY MASS INDEX: 24.98 KG/M2 | HEIGHT: 65 IN | DIASTOLIC BLOOD PRESSURE: 86 MMHG

## 2024-03-18 DIAGNOSIS — I47.10 SVT (SUPRAVENTRICULAR TACHYCARDIA): ICD-10-CM

## 2024-03-18 DIAGNOSIS — R00.2 PALPITATIONS: ICD-10-CM

## 2024-03-18 LAB
AORTIC DIMENSIONLESS INDEX: 0.7 (DI)
ASCENDING AORTA: 2.2 CM
BH CV ECHO MEAS - ACS: 2.1 CM
BH CV ECHO MEAS - AO MAX PG: 10.7 MMHG
BH CV ECHO MEAS - AO MEAN PG: 5.6 MMHG
BH CV ECHO MEAS - AO ROOT DIAM: 2.9 CM
BH CV ECHO MEAS - AO V2 MAX: 163.7 CM/SEC
BH CV ECHO MEAS - AO V2 VTI: 32.1 CM
BH CV ECHO MEAS - AVA(I,D): 2.11 CM2
BH CV ECHO MEAS - EDV(CUBED): 111 ML
BH CV ECHO MEAS - EDV(MOD-SP2): 102 ML
BH CV ECHO MEAS - EDV(MOD-SP4): 107 ML
BH CV ECHO MEAS - EF(MOD-BP): 63 %
BH CV ECHO MEAS - EF(MOD-SP2): 65.7 %
BH CV ECHO MEAS - EF(MOD-SP4): 64.5 %
BH CV ECHO MEAS - ESV(CUBED): 28.8 ML
BH CV ECHO MEAS - ESV(MOD-SP2): 35 ML
BH CV ECHO MEAS - ESV(MOD-SP4): 38 ML
BH CV ECHO MEAS - FS: 36.2 %
BH CV ECHO MEAS - IVS/LVPW: 0.94 CM
BH CV ECHO MEAS - IVSD: 0.84 CM
BH CV ECHO MEAS - LAT PEAK E' VEL: 22.8 CM/SEC
BH CV ECHO MEAS - LV MASS(C)D: 141.9 GRAMS
BH CV ECHO MEAS - LV MAX PG: 4.8 MMHG
BH CV ECHO MEAS - LV MEAN PG: 2.44 MMHG
BH CV ECHO MEAS - LV V1 MAX: 109.9 CM/SEC
BH CV ECHO MEAS - LV V1 VTI: 22.1 CM
BH CV ECHO MEAS - LVIDD: 4.8 CM
BH CV ECHO MEAS - LVIDS: 3.1 CM
BH CV ECHO MEAS - LVOT AREA: 3.1 CM2
BH CV ECHO MEAS - LVOT DIAM: 1.98 CM
BH CV ECHO MEAS - LVPWD: 0.9 CM
BH CV ECHO MEAS - MED PEAK E' VEL: 12.7 CM/SEC
BH CV ECHO MEAS - MV A DUR: 0.14 SEC
BH CV ECHO MEAS - MV A MAX VEL: 60.7 CM/SEC
BH CV ECHO MEAS - MV DEC SLOPE: 542.9 CM/SEC2
BH CV ECHO MEAS - MV DEC TIME: 0.22 SEC
BH CV ECHO MEAS - MV E MAX VEL: 97 CM/SEC
BH CV ECHO MEAS - MV E/A: 1.6
BH CV ECHO MEAS - MV MAX PG: 6.8 MMHG
BH CV ECHO MEAS - MV MEAN PG: 2.06 MMHG
BH CV ECHO MEAS - MV P1/2T: 68.8 MSEC
BH CV ECHO MEAS - MV V2 VTI: 33.7 CM
BH CV ECHO MEAS - MVA(P1/2T): 3.2 CM2
BH CV ECHO MEAS - MVA(VTI): 2.01 CM2
BH CV ECHO MEAS - PA ACC TIME: 0.21 SEC
BH CV ECHO MEAS - PA V2 MAX: 126.2 CM/SEC
BH CV ECHO MEAS - PULM A REVS DUR: 0.11 SEC
BH CV ECHO MEAS - PULM A REVS VEL: 43.5 CM/SEC
BH CV ECHO MEAS - PULM DIAS VEL: 53 CM/SEC
BH CV ECHO MEAS - PULM S/D: 1.02
BH CV ECHO MEAS - PULM SYS VEL: 54 CM/SEC
BH CV ECHO MEAS - QP/QS: 1.19
BH CV ECHO MEAS - RAP SYSTOLE: 8 MMHG
BH CV ECHO MEAS - RV MAX PG: 4.4 MMHG
BH CV ECHO MEAS - RV V1 MAX: 104.5 CM/SEC
BH CV ECHO MEAS - RV V1 VTI: 24.7 CM
BH CV ECHO MEAS - RVOT DIAM: 2.04 CM
BH CV ECHO MEAS - RVSP: 17 MMHG
BH CV ECHO MEAS - SV(LVOT): 67.8 ML
BH CV ECHO MEAS - SV(MOD-SP2): 67 ML
BH CV ECHO MEAS - SV(MOD-SP4): 69 ML
BH CV ECHO MEAS - SV(RVOT): 80.6 ML
BH CV ECHO MEAS - TR MAX PG: 7.4 MMHG
BH CV ECHO MEAS - TR MAX VEL: 135.7 CM/SEC
BH CV ECHO MEASUREMENTS AVERAGE E/E' RATIO: 5.46
BH CV XLRA - RV BASE: 2.47 CM
BH CV XLRA - RV LENGTH: 7.8 CM
BH CV XLRA - RV MID: 2.9 CM
BH CV XLRA - TDI S': 14.1 CM/SEC
LEFT ATRIUM VOLUME INDEX: 23.1 ML/M2
SINUS: 2.6 CM
STJ: 2.06 CM

## 2024-03-18 PROCEDURE — 93306 TTE W/DOPPLER COMPLETE: CPT | Performed by: INTERNAL MEDICINE

## 2024-03-18 PROCEDURE — 93306 TTE W/DOPPLER COMPLETE: CPT

## 2024-04-09 ENCOUNTER — TELEPHONE (OUTPATIENT)
Dept: CARDIOLOGY | Facility: HOSPITAL | Age: 24
End: 2024-04-09

## 2024-05-15 RX ORDER — SERTRALINE HYDROCHLORIDE 25 MG/1
25 TABLET, FILM COATED ORAL DAILY
Qty: 30 TABLET | Refills: 2 | Status: SHIPPED | OUTPATIENT
Start: 2024-05-15

## 2024-05-29 ENCOUNTER — TELEPHONE (OUTPATIENT)
Dept: INTERNAL MEDICINE | Facility: CLINIC | Age: 24
End: 2024-05-29
Payer: COMMERCIAL

## 2024-05-29 NOTE — TELEPHONE ENCOUNTER
Spoke with clinical coordinator in office, she explained to me per  since pt is out of state we unfortunately cannot see pt on telehealth, spoke with pt, explained this to pt, pt verbalized understanding  and stated he will be at the apt with provider.

## 2024-05-29 NOTE — TELEPHONE ENCOUNTER
Caller: Conrado Cummins    Relationship to patient: Self    Best call back number: [phone]    Patient is needing: PATIENT CALLED REQUESTING TO SWITCH HIS APPOINTMENT ON 6.4.24 WITH [facility] TO TELEHEALTH APPOINTMENT INSTEAD OF IN OFFICE. PATIENT DID SAY THAT HE WAS NOT CURRENTLY IN THE STATE AND WOULD BE IN [place] AT THE TIME OF THE APPOINTMENT. HUB UNABLE TO ACCOMMODATE REQUEST.

## 2024-06-04 ENCOUNTER — OFFICE VISIT (OUTPATIENT)
Dept: INTERNAL MEDICINE | Facility: CLINIC | Age: 24
End: 2024-06-04
Payer: COMMERCIAL

## 2024-06-04 VITALS
SYSTOLIC BLOOD PRESSURE: 134 MMHG | HEART RATE: 86 BPM | RESPIRATION RATE: 18 BRPM | HEIGHT: 65 IN | DIASTOLIC BLOOD PRESSURE: 86 MMHG | WEIGHT: 149.4 LBS | TEMPERATURE: 97.9 F | BODY MASS INDEX: 24.89 KG/M2 | OXYGEN SATURATION: 99 %

## 2024-06-04 DIAGNOSIS — F41.9 ANXIETY: ICD-10-CM

## 2024-06-04 DIAGNOSIS — I47.10 SVT (SUPRAVENTRICULAR TACHYCARDIA): Primary | ICD-10-CM

## 2024-06-04 DIAGNOSIS — E55.9 VITAMIN D DEFICIENCY: ICD-10-CM

## 2024-06-04 DIAGNOSIS — Z13.220 SCREENING, LIPID: ICD-10-CM

## 2024-06-04 PROCEDURE — 99214 OFFICE O/P EST MOD 30 MIN: CPT | Performed by: INTERNAL MEDICINE

## 2024-06-04 RX ORDER — KETOCONAZOLE 20 MG/ML
1 SHAMPOO TOPICAL 2 TIMES WEEKLY
COMMUNITY
Start: 2024-05-15

## 2024-06-04 NOTE — PROGRESS NOTES
"Chief Complaint  Anxiety (3 month follow up ), Vitamin D Deficiency, and Rapid Heart Rate (Follow up )    Subjective      Conrado Cummins is a 23 y.o. male who presents to [facility]     States that he started the zoloft  He hasn't noticed any SVT other than once when he was at the gym    He has tried really intensly to avoid triggers    He feels like he has lost interested in a few things like playing guitar  When his grandfather  he felt like he was emotionless      Objective   Vital Signs:   Vitals:    24 0908   BP: 134/86   BP Location: Left arm   Patient Position: Sitting   Cuff Size: Adult   Pulse: 86   Resp: 18   Temp: 97.9 °F (36.6 °C)   TempSrc: Temporal   SpO2: 99%   Weight: 67.8 kg (149 lb 6.4 oz)   Height: 165 cm (64.96\")     Body mass index is 24.89 kg/m².    Wt Readings from Last 3 Encounters:   24 67.8 kg (149 lb 6.4 oz)   24 68 kg (149 lb 14.6 oz)   24 68.4 kg (150 lb 12.8 oz)     BP Readings from Last 3 Encounters:   24 134/86   24 148/86   24 130/74       Health Maintenance   Topic Date Due    HEPATITIS C SCREENING  Never done    COVID-19 Vaccine (2023- season) 2024 (Originally 2023)    INFLUENZA VACCINE  2024    ANNUAL PHYSICAL  08/15/2024    TDAP/TD VACCINES (3 - Td or Tdap) 2031    Pneumococcal Vaccine 0-64  Aged Out       Physical Exam  Vitals reviewed.   Constitutional:       Appearance: Normal appearance. He is well-developed.   HENT:      Head: Normocephalic and atraumatic.      Right Ear: External ear normal.      Left Ear: External ear normal.   Eyes:      Conjunctiva/sclera: Conjunctivae normal.      Pupils: Pupils are equal, round, and reactive to light.   Cardiovascular:      Rate and Rhythm: Normal rate and regular rhythm.      Heart sounds: No murmur heard.     No friction rub. No gallop.   Pulmonary:      Effort: Pulmonary effort is normal.      Breath sounds: " Normal breath sounds. No wheezing or rhonchi.   Skin:     General: Skin is warm and dry.   Neurological:      Mental Status: He is alert and oriented to person, place, and time.   Psychiatric:         Mood and Affect: Affect normal.         Behavior: Behavior normal.         Thought Content: Thought content normal.          Result Review :  The following data was reviewed by: oLri Mac MD on 06/04/2024:         Procedures          Assessment & Plan  SVT (supraventricular tachycardia)  Await appt with Dr Saldana  Anxiety  Decrease zoloft to half of current dose  Vitamin D deficiency  Cont to monitor levels  Screening, lipid      Orders Placed This Encounter   Procedures    Vitamin D 25 hydroxy    Lipid panel    Comprehensive Metabolic Panel    CBC & Differential             BMI is within normal parameters. No other follow-up for BMI required.         FOLLOW UP  Return in about 3 months (around 9/4/2024).  Patient was given instructions and counseling regarding his condition or for health maintenance advice. Please see specific information pulled into the AVS if appropriate.       Lori Mac MD  06/04/24  09:32 EDT    CURRENT & DISCONTINUED MEDICATIONS  Current Outpatient Medications   Medication Instructions    ketoconazole (NIZORAL) 2 % shampoo 1 Application, Topical, 2 Times Weekly    metoprolol succinate XL (TOPROL-XL) 25 mg, Oral, Daily    sertraline (ZOLOFT) 25 mg, Oral, Daily       Medications Discontinued During This Encounter   Medication Reason    busPIRone (BUSPAR) 5 MG tablet

## 2024-07-03 ENCOUNTER — OFFICE VISIT (OUTPATIENT)
Age: 24
End: 2024-07-03
Payer: COMMERCIAL

## 2024-07-03 VITALS
BODY MASS INDEX: 24.83 KG/M2 | WEIGHT: 149 LBS | HEIGHT: 65 IN | DIASTOLIC BLOOD PRESSURE: 84 MMHG | HEART RATE: 84 BPM | SYSTOLIC BLOOD PRESSURE: 120 MMHG

## 2024-07-03 DIAGNOSIS — I47.10 PAROXYSMAL SVT (SUPRAVENTRICULAR TACHYCARDIA): Primary | ICD-10-CM

## 2024-07-03 PROCEDURE — 93000 ELECTROCARDIOGRAM COMPLETE: CPT | Performed by: INTERNAL MEDICINE

## 2024-07-03 PROCEDURE — 99204 OFFICE O/P NEW MOD 45 MIN: CPT | Performed by: INTERNAL MEDICINE

## 2024-07-03 NOTE — PROGRESS NOTES
Date of Office Visit: 2024  Encounter Provider: Enmanuel Saldana MD  Place of Service: [facility] CARDIOLOGY  Patient Name: Conrado Cummins  : 2000    Subjective:     Encounter Date:2024      Patient ID: Conrado Cummins is a 24 y.o. male who has a cc of  with a history of tachycardia     He describes rapid onset tachycardia and his Apple wwatch recorded an SVT.     One during exertion. Most not. Some with anxiety or stress. All stop quickly.     He can stop the tachy w vagal.     The patient had a good year.   No anginal chest pain,   No sig sampson,   No soa,   No fainting,  No orthostasis.   No edema.   Exercise tolerance: good.     There have been no hospital admission since the last visit.     There have been no bleeding events.       Past Medical History:   Diagnosis Date    Allergic     Allergies n    Anxiety     History of medical problems SVT    Hypertension     Visual impairment        Social History     Socioeconomic History    Marital status:    Tobacco Use    Smoking status: Never     Passive exposure: Never    Smokeless tobacco: Never   Vaping Use    Vaping status: Never Used   Substance and Sexual Activity    Alcohol use: Not Currently     Alcohol/week: 2.0 standard drinks of alcohol     Comment: once a week    Drug use: Never    Sexual activity: Yes     Partners: Female     Birth control/protection: Nexplanon       Family History   Problem Relation Age of Onset    Hyperlipidemia Father     Hypertension Father        Review of Systems   Constitutional: Negative for fever and night sweats.   HENT:  Negative for ear pain and stridor.    Eyes:  Negative for discharge and visual halos.   Cardiovascular:  Negative for cyanosis.   Respiratory:  Negative for hemoptysis and sputum production.    Hematologic/Lymphatic: Negative for adenopathy.   Skin:  Negative for nail changes and unusual hair distribution.   Musculoskeletal:  Negative for gout and joint swelling.  "  Gastrointestinal:  Negative for bowel incontinence and flatus.   Genitourinary:  Negative for dysuria and flank pain.   Neurological:  Negative for seizures and tremors.   Psychiatric/Behavioral:  Negative for altered mental status. The patient is not nervous/anxious.             Objective:     Vitals:    07/03/24 1058   BP: 120/84   Pulse: 84   Weight: 67.6 kg (149 lb)   Height: 165.1 cm (65\")         Eyes:      General:         Right eye: No discharge.         Left eye: No discharge.   HENT:      Head: Normocephalic and atraumatic.   Neck:      Thyroid: No thyromegaly.      Vascular: No JVD.   Pulmonary:      Effort: Pulmonary effort is normal.      Breath sounds: Normal breath sounds. No rales.   Cardiovascular:      Normal rate. Regular rhythm.      No gallop.    Edema:     Peripheral edema absent.   Abdominal:      General: Bowel sounds are normal.      Palpations: Abdomen is soft.      Tenderness: There is no abdominal tenderness.   Musculoskeletal: Normal range of motion.         General: No deformity. Skin:     General: Skin is warm and dry.      Findings: No erythema.   Neurological:      Mental Status: Alert and oriented to person, place, and time.      Motor: Normal muscle tone.   Psychiatric:         Behavior: Behavior normal.         Thought Content: Thought content normal.           ECG 12 Lead    Date/Time: 7/3/2024 11:27 AM  Performed by: Enmanuel Saldana MD    Authorized by: Enmanuel Saldana MD  Comparison: compared with previous ECG   Similar to previous ECG  Rhythm: sinus rhythm  Rate: normal  Conduction: conduction normal  ST Segments: ST segments normal  T Waves: T waves normal  QRS axis: normal    Clinical impression: normal ECG          Lab Review:       Assessment:          Diagnosis Plan   1. Paroxysmal SVT (supraventricular tachycardia)               Plan:       He has PSVT -- looks like AVNRT     I gave him the discussion and mostly reassured him about the benign nature of PSVT.     Though " he thinks that zoloft maybe reducing the episodes, I don't love that as a Rx for SVT.     He could also come off beta blocker and see how the episodes go.     Ultimately the treatment of SVT is a preference sensitive decision.     Ablation is curative but with a 1% risk and it is up to patients to decide with us. Right now he has few episodes.

## 2024-07-15 ENCOUNTER — PATIENT MESSAGE (OUTPATIENT)
Dept: INTERNAL MEDICINE | Facility: CLINIC | Age: 24
End: 2024-07-15
Payer: COMMERCIAL

## 2024-07-22 NOTE — TELEPHONE ENCOUNTER
From: Conrado Cummins  To: Lori Mac  Sent: 7/15/2024 8:07 PM EDT  Subject: Night Sweats    Hi Dr. Mac    I wanted to send a message before all [facility] providers become out of network for me at the end of July of my only medical concern that I have left under your care. Back in 2020, you had ordered a CT scan and blood work for my concern of having night sweats for the past several years (estimated started happening in 2018). I remember the CT scan and blood didn't show anything serious and have had several more rounds of blood work since but I don't think we ever had a follow up to discuss what could be causing the night sweats (I can't find any visit summaries in [facility] at least). Of course, now with taking the zoloft, I know can cause this as a side effect but it had been going on much longer before I took that or any medication at all. It does not wake me hardly ever, but my wife says it is very heavy sweating and I notice the sheets get quite wet if I wake up to use the restroom. If I wore clothing at night, it would likely result in needing to change clothing. I also sleep with   very minimal bedding and set the temperature to 66 degrees at night. Can this be a normal occurrence?    Thank you.

## 2024-08-26 RX ORDER — SERTRALINE HYDROCHLORIDE 25 MG/1
25 TABLET, FILM COATED ORAL DAILY
Qty: 90 TABLET | Refills: 0 | Status: SHIPPED | OUTPATIENT
Start: 2024-08-26